# Patient Record
Sex: FEMALE | Race: OTHER | NOT HISPANIC OR LATINO
[De-identification: names, ages, dates, MRNs, and addresses within clinical notes are randomized per-mention and may not be internally consistent; named-entity substitution may affect disease eponyms.]

---

## 2017-01-30 ENCOUNTER — RECORD ABSTRACTING (OUTPATIENT)
Age: 67
End: 2017-01-30

## 2017-01-30 DIAGNOSIS — Z98.890 OTHER SPECIFIED POSTPROCEDURAL STATES: ICD-10-CM

## 2017-01-30 DIAGNOSIS — Z87.891 PERSONAL HISTORY OF NICOTINE DEPENDENCE: ICD-10-CM

## 2017-01-30 DIAGNOSIS — Z12.4 ENCOUNTER FOR SCREENING FOR MALIGNANT NEOPLASM OF CERVIX: ICD-10-CM

## 2017-01-30 DIAGNOSIS — N39.3 STRESS INCONTINENCE (FEMALE) (MALE): ICD-10-CM

## 2017-01-30 DIAGNOSIS — Z92.89 PERSONAL HISTORY OF OTHER MEDICAL TREATMENT: ICD-10-CM

## 2017-01-30 DIAGNOSIS — Z80.3 FAMILY HISTORY OF MALIGNANT NEOPLASM OF BREAST: ICD-10-CM

## 2017-01-30 RX ORDER — CHROMIUM 200 MCG
TABLET ORAL
Refills: 0 | Status: ACTIVE | COMMUNITY

## 2017-03-02 ENCOUNTER — RESULT REVIEW (OUTPATIENT)
Age: 67
End: 2017-03-02

## 2017-03-09 ENCOUNTER — TRANSCRIPTION ENCOUNTER (OUTPATIENT)
Age: 67
End: 2017-03-09

## 2017-10-27 ENCOUNTER — OUTPATIENT (OUTPATIENT)
Dept: OUTPATIENT SERVICES | Facility: HOSPITAL | Age: 67
LOS: 1 days | Discharge: HOME | End: 2017-10-27

## 2017-10-27 DIAGNOSIS — M25.569 PAIN IN UNSPECIFIED KNEE: ICD-10-CM

## 2020-01-17 ENCOUNTER — RESULT REVIEW (OUTPATIENT)
Age: 70
End: 2020-01-17

## 2021-03-15 PROBLEM — Z00.00 ENCOUNTER FOR PREVENTIVE HEALTH EXAMINATION: Status: ACTIVE | Noted: 2021-03-15

## 2021-03-15 PROBLEM — Z78.9 NON-SMOKER: Status: ACTIVE | Noted: 2021-03-15

## 2021-03-15 PROBLEM — Z72.89 ALCOHOL USE: Status: ACTIVE | Noted: 2021-03-15

## 2021-03-15 RX ORDER — LEVOTHYROXINE SODIUM 137 UG/1
TABLET ORAL
Refills: 0 | Status: ACTIVE | COMMUNITY

## 2021-03-18 ENCOUNTER — TRANSCRIPTION ENCOUNTER (OUTPATIENT)
Age: 71
End: 2021-03-18

## 2021-03-18 ENCOUNTER — APPOINTMENT (OUTPATIENT)
Dept: PEDIATRIC ORTHOPEDIC SURGERY | Facility: CLINIC | Age: 71
End: 2021-03-18
Payer: MEDICARE

## 2021-03-18 DIAGNOSIS — Z78.9 OTHER SPECIFIED HEALTH STATUS: ICD-10-CM

## 2021-03-18 DIAGNOSIS — Z72.89 OTHER PROBLEMS RELATED TO LIFESTYLE: ICD-10-CM

## 2021-03-18 PROCEDURE — 99204 OFFICE O/P NEW MOD 45 MIN: CPT

## 2021-03-18 PROCEDURE — 99072 ADDL SUPL MATRL&STAF TM PHE: CPT

## 2021-03-18 NOTE — PHYSICAL EXAM
[UE/LE] : Sensory: Intact in bilateral upper & lower extremities [ALL] : dorsalis pedis, posterior tibial, femoral, popliteal, and radial 2+ and symmetric bilaterally [Poor Appearance] : well-appearing [Acute Distress] : not in acute distress [Normal] : Oriented to person, place, and time, insight and judgement were intact and the affect was normal [de-identified] : images MRI/Xray [facility] 3/2/21\par \par MRI:Recent moderate compression deformity involving the superior endplate of the L1 vertebral body with mild retropulsion of osseous material into the anterior epidural space, resulting in mild central canal stenosis however without mass effect on the conus medullaris. Extension of edematous changes into the pedicles and facet joints bilaterally with heterogeneous T2 signal involving the right T12/L1 neural foramen which may represent osseous material.\par Chronic mild compression deformity involving the inferior endplate of the L5 vertebral body with slight retropulsion of osseous material into the anterior epidural space.\par Mild degenerative changes versus contusions involving the inferior endplate of the T12 vertebral body.\par Multilevel lumbar disc disease with note made of severe left as well as moderate right neural foraminal narrowing at L5/S1.\par \par Xray: Age indeterminate compression fracture of L1. An MRI of the lumbar spine would be helpful to assess for the presence or absence of marrow edema, as well as if there is any retropulsed fragment. Degenerative disc disease in the lower lumbar spine with prominent bilateral facet joint arthropathy at L5-S1.\par \par \par I visually reviewed the images\par

## 2021-03-18 NOTE — ASSESSMENT
[FreeTextEntry1] : She has no pain now and is back to her base line\par Her Xrays and MRI show a compression fractures\par She has good aligment radiographically and good posture\par At this point she's clinically healed with no neurodefecity and no pain\par At this point she doesn't need any further treatment for her compression fractures as they are healed \par But I am concerned about her risk of fractures in general and her low bone density\par I suggested she get a repeat dexa and follow up with an endocrinoligist to start her on an anabolic agent\par She's tried bisphosfanate in the past\par

## 2021-03-18 NOTE — HISTORY OF PRESENT ILLNESS
[Pain Location] : pain [Lumbar] : lumbar region [___ wks] : [unfilled] week(s) ago [7] : a current pain level of 7/10 [Constant] : ~He/She~ states the symptoms seem to be constant [None] : No relieving factors are noted [de-identified] : NOEL has been having back pain for the last few weeks. She was shoveling the snow a few weeks ago \par Pain comes and goes, happens with some activities, no specific pattern. Not better with any meds. \par Has not Tried PT\par \par denies any history of  fever, any history of numbness and history of tingling and history of change in bladder or bowel function and history of weakness and history of bug or tick bites or rashes\par \par Positive family history of back pain. No family history of bone diseases or RA. \par \par Please see below for past medical/surgical history.\par

## 2024-03-27 ENCOUNTER — APPOINTMENT (OUTPATIENT)
Dept: NEUROSURGERY | Facility: CLINIC | Age: 74
End: 2024-03-27
Payer: MEDICARE

## 2024-03-27 VITALS — WEIGHT: 165 LBS | HEIGHT: 65 IN | BODY MASS INDEX: 27.49 KG/M2

## 2024-03-27 DIAGNOSIS — Z86.79 PERSONAL HISTORY OF OTHER DISEASES OF THE CIRCULATORY SYSTEM: ICD-10-CM

## 2024-03-27 DIAGNOSIS — M54.50 LOW BACK PAIN, UNSPECIFIED: ICD-10-CM

## 2024-03-27 DIAGNOSIS — Z82.49 FAMILY HISTORY OF ISCHEMIC HEART DISEASE AND OTHER DISEASES OF THE CIRCULATORY SYSTEM: ICD-10-CM

## 2024-03-27 DIAGNOSIS — Z86.39 PERSONAL HISTORY OF OTHER ENDOCRINE, NUTRITIONAL AND METABOLIC DISEASE: ICD-10-CM

## 2024-03-27 DIAGNOSIS — M48.50XA COLLAPSED VERTEBRA, NOT ELSEWHERE CLASSIFIED, SITE UNSPECIFIED, INITIAL ENCOUNTER FOR FRACTURE: ICD-10-CM

## 2024-03-27 PROCEDURE — 99203 OFFICE O/P NEW LOW 30 MIN: CPT

## 2024-03-27 RX ORDER — ATORVASTATIN CALCIUM 80 MG/1
TABLET, FILM COATED ORAL
Refills: 0 | Status: ACTIVE | COMMUNITY

## 2024-03-27 RX ORDER — AMLODIPINE BESYLATE 5 MG/1
TABLET ORAL
Refills: 0 | Status: ACTIVE | COMMUNITY

## 2024-03-27 NOTE — HISTORY OF PRESENT ILLNESS
[de-identified] : Ms. QUINONEZ developed acute lower back pain on 2/13/24 after lifting up her .  Immediately after she developed acute lower back pain.  X-rays of the thoracolumbar spine demonstrated a new mild compression deformity of L3.  She has known lumbar degenerative disease disease with a chronic compression deformity of L1.  Today, although she continues to have a component of back pain it has improved over the past 6 weeks.  She denies radiculopathy.  Symptoms are currently tolerable.  She would like to restart yoga exercises.  No recent physical therapy.  PHYSICAL EXAM:  Constitutional: Well appearing, no distress HEENT: Normocephalic Atraumatic Psychiatric: Alert and oriented to all spheres, normal mood Pulmonary: No respiratory distress Neurologic:  CN II-XII grossly intact Palpation: mild lower lumbar tenderness.  Strength: Full strength in all major muscle groups Sensation: Full sensation to light touch in all extremities Reflexes:   2+ patellar  2+ ankle jerk Mild restriction in ROM  Signs: SLR negative Gait: steady, walking w/o assistance.

## 2024-03-27 NOTE — ASSESSMENT
[FreeTextEntry1] : Ms. QUINONEZ developed acute lower back pain 6 weeks ago and subsequently was found to have a mild acute L3 compression deformity.  Her back pain has been improving.  She has requested to restart her yoga exercises.  I have cleared her to do so.  If symptoms change or worsen she will notify me however she may follow-up as needed.  All questions answered today.    I personally reviewed with the PA, this patient's history and physical exam findings, as documented above. I have discussed the relevant areas of concern, having direct implications to the presenting problems and illnesses, and I have personally examined all pertinent and positive and negative findings, which impact their neurosurgical treatment.  MS MONICA JacksonC Senior Physician Assistant [facility]    Mona Morris MD FAANS Chair, Department of Neurosurgery [facility]